# Patient Record
Sex: MALE | Race: WHITE
[De-identification: names, ages, dates, MRNs, and addresses within clinical notes are randomized per-mention and may not be internally consistent; named-entity substitution may affect disease eponyms.]

---

## 2017-01-19 ENCOUNTER — HOSPITAL ENCOUNTER (OUTPATIENT)
Dept: HOSPITAL 59 - SUR | Age: 82
Discharge: HOME | End: 2017-01-19
Attending: ORTHOPAEDIC SURGERY
Payer: MEDICARE

## 2017-01-19 DIAGNOSIS — E78.00: ICD-10-CM

## 2017-01-19 DIAGNOSIS — G56.01: Primary | ICD-10-CM

## 2017-01-19 DIAGNOSIS — I10: ICD-10-CM

## 2017-01-19 PROCEDURE — 64721 CARPAL TUNNEL SURGERY: CPT

## 2017-01-19 PROCEDURE — 01810 ANES PX NRV MUSC F/ARM WRST: CPT

## 2017-01-20 NOTE — OPERATIVE NOTE
DATE OF SURGERY:  01/19/2017.



SURGEON:  Genaro Higgins D.O.



REFERRING PHYSICIAN:  Estiven Sethi D.O. 



PREOPERATIVE DIAGNOSIS:  

CARPAL TUNNEL SYNDROME OF THE RIGHT WRIST.



POSTOPERATIVE DIAGNOSIS:  

CARPAL TUNNEL SYNDROME OF THE RIGHT WRIST.



OPERATIVE PROCEDURES: 

Decompression of the right median nerve of the wrist using 3.5 loupe 
magnification.



DESCRIPTION OF PROCEDURE:  This 82-year-old male was taken to the operating 
room and was placed in the supine position on the operating room table.  A 
general anesthetic was induced, and the right upper extremity was elevated.  It 
was prepped with Hibiclens and draped in the usual sterile fashion.  It was 
exsanguinated and the tourniquet was inflated to 250 mm Hg.



A palmar incision was utilized following the hypothenar crease from the level 
of the base of the web space of the thumb to the flexor crease of the wrist.  
Dissection was carried down through the skin and subcutaneous tissue.  
Hemostasis was obtained with the electrocautery.  The palmar fascia was divided 
in line with the skin incision to expose the flexor retinaculum.  This was 
punctured and split to its proximal margin.  With the contents of the carpal 
tunnel under direct vision, the transverse carpal ligament was transected along 
its ulnar border.  The radial flap was raised to expose the entire median nerve 
under the transverse carpal ligament.  The recurrent motor branch of the median 
nerve was identified and was found to be normal.  The patient had significant 
hyperemia of the nerve as well as mild to moderate hourglass deformity of the 
nerve under the transverse carpal ligament secondary to the compression.  



After the nerve was completely decompressed, the wound was irrigated and the 
tourniquet was released.  Hemostasis was obtained with the electrocautery.  The 
wound was closed with interrupted 6-0 nylon suture.  Sterile dressings were 
applied with plaster splint immobilization with the wrist in slight 
dorsiflexion and the thumb in an adducted position.  



The patient was then taken to the recovery room in satisfactory condition.



GROSS PATHOLOGY:  This patient demonstrated hyperemia of the median nerve as 
well as hourglass deformity as described above.  Mild atrophy of the nerve was 
also identified.







____________________________   ____________________________

Genaro Higgins D.O.       Date   Time



Job Number:  [id number]
MTDD

## 2017-07-05 ENCOUNTER — HOSPITAL ENCOUNTER (EMERGENCY)
Dept: HOSPITAL 59 - ER | Age: 82
Discharge: ANOTHER HOSPITAL | End: 2017-07-05
Payer: MEDICARE

## 2017-07-05 DIAGNOSIS — G81.90: ICD-10-CM

## 2017-07-05 DIAGNOSIS — Z95.1: ICD-10-CM

## 2017-07-05 DIAGNOSIS — I10: ICD-10-CM

## 2017-07-05 DIAGNOSIS — R29.715: ICD-10-CM

## 2017-07-05 DIAGNOSIS — I63.9: Primary | ICD-10-CM

## 2017-07-05 DIAGNOSIS — Z87.891: ICD-10-CM

## 2017-07-05 DIAGNOSIS — Z86.73: ICD-10-CM

## 2017-07-05 DIAGNOSIS — I25.2: ICD-10-CM

## 2017-07-05 DIAGNOSIS — R51: ICD-10-CM

## 2017-07-05 LAB
ANION GAP SERPL CALC-SCNC: 7.9 MMOL/L (ref 7–16)
APTT BLD: 28.9 SECONDS (ref 24.5–39.1)
BASOPHILS NFR BLD: 0.2 % (ref 0–6)
BUN SERPL-MCNC: 9 MG/DL (ref 9–20)
CO2 SERPL-SCNC: 28.1 MMOL/L (ref 22–30)
CREAT SERPL-MCNC: 0.7 MG/DL (ref 0.66–1.25)
EOSINOPHIL NFR BLD: 1.5 % (ref 0–6)
ERYTHROCYTE [DISTWIDTH] IN BLOOD BY AUTOMATED COUNT: 13.1 % (ref 11.5–14.5)
EST GLOMERULAR FILTRATION RATE: > 60 ML/MIN
GLUCOSE SERPL-MCNC: 104 MG/DL (ref 70–110)
GRANULOCYTES NFR BLD: 63.4 % (ref 47–80)
HCT VFR BLD CALC: 38.6 % (ref 42–52)
HGB BLD-MCNC: 13.2 GM/DL (ref 14–18)
INR PPP: 0.97
LYMPHOCYTES NFR BLD AUTO: 26.9 % (ref 16–45)
MCH RBC QN AUTO: 30.5 PG (ref 27–33)
MCHC RBC AUTO-ENTMCNC: 34.2 G/DL (ref 32–36)
MCV RBC AUTO: 89.4 FL (ref 81–97)
MONOCYTES NFR BLD: 8 % (ref 0–9)
PLATELET # BLD: 225 K/UL (ref 130–400)
PMV BLD AUTO: 9 FL (ref 7.4–10.4)
PROTHROMBIN TIME: 11 SECONDS (ref 9.5–12.1)
RBC # BLD AUTO: 4.32 M/UL (ref 4.4–5.7)
WBC # BLD AUTO: 8.5 K/UL (ref 4.2–12.2)

## 2017-07-05 PROCEDURE — 80048 BASIC METABOLIC PNL TOTAL CA: CPT

## 2017-07-05 PROCEDURE — 99285 EMERGENCY DEPT VISIT HI MDM: CPT

## 2017-07-05 PROCEDURE — 85610 PROTHROMBIN TIME: CPT

## 2017-07-05 PROCEDURE — 85730 THROMBOPLASTIN TIME PARTIAL: CPT

## 2017-07-05 PROCEDURE — 93005 ELECTROCARDIOGRAM TRACING: CPT

## 2017-07-05 PROCEDURE — 70450 CT HEAD/BRAIN W/O DYE: CPT

## 2017-07-05 PROCEDURE — 85025 COMPLETE CBC W/AUTO DIFF WBC: CPT

## 2017-07-05 NOTE — EMERGENCY DEPARTMENT RECORD
History of Present Illness





- General


Chief Complaint: Numbness


Stated Complaint: RIGHT HAND NUMBNESS/POSSIBLE STROKE


Time Seen by Provider: 17 10:26


Source: Patient, RN notes reviewed


Mode of Arrival: Ambulatory





- History of Present Illness


Initial Comments: 


weakness and numbness of the right hand and arm new in the last 14 hours 

started at 8pm last night. PCP Dr. Sethi and  he had a previous CVA 3 years 

ago on the same side but he regained his function in 3-4 days last time.  PMH 

CABG  and  and he had carpal tunnel surg 2017. Wife brought him to 

the hospital. Slight headache PMH HTN and hypercholesterolemia





Onset/Timin


-: Hour(s)


Location: Right arm


History of same: Yes


Place: Home


Severity: Moderate





- [place] Coma Scale


Eye Response: (4) Open spontaneously


Motor Response: (6) Obeys commands


Verbal Response: (5) Oriented


[place] Total: 15





- Related Data


Home Medications: 


 Home Medications











 Medication  Instructions  Recorded  Confirmed  Last Taken


 


Aspirin Chewable 325 mg PO DAILY 10/16/14 07/05/17 1 Day Ago





    ~17


 


Cyanocobalamin (Vitamin B-12) 2,000 mcg PO DAILY 10/16/14 07/05/17 1 Day Ago





[Vitamin B-12]    ~17


 


Diltiazem HCl [Cardizem Cd] 240 mg PO DAILY 10/16/14 07/05/17 1 Day Ago





    ~17


 


Folic Acid 0.4 mg PO DAILY 10/16/14 07/05/17 1 Day Ago





    ~17


 


Glucosamine/Methylsulfonylmeth 1 each PO DAILY 10/16/14 07/05/17 1 Day Ago





[Glucosamine-MSM Caplet]    ~17


 


Metoprolol Succinate [Toprol Xl] 25 mg PO DAILY 10/16/14 07/05/17 1 Day Ago





    ~17


 


Omega-3 Fatty Acids [Omega-3] 1,000 mg PO DAILY 10/16/14 07/05/17 1 Day Ago





    ~17


 


Pravastatin Sodium [Pravastatin 80 mg PO QHS 10/16/14 07/05/17 1 Day Ago





Sodium]    ~17


 


Pyridoxine HCl [Vitamin B-6] 100 mg PO DAILY 10/16/14 07/05/17 1 Day Ago





    ~17











Allergies/Adverse Reactions: 


 Allergies











Allergy/AdvReac Type Severity Reaction Status Date / Time


 


No Known Drug Allergies Allergy   Verified 17 10:19














Travel Screening





- Travel/Exposure Within Last 30 Days


Have you traveled within the last 30 days?: No





- Travel/Exposure Within Last Year


Have you traveled outside the [place] in the last year?: No





- Additonal Travel Details


Have you been exposed to anyone with a communicable illness?: No





- Travel Symptoms


Symptom Screening: None





Review of Systems


Reviewed: No additional complaints except as noted below


Constitutional: Reports: As per HPI.  Denies: Chills, Fever, Malaise, Night 

sweats, Weakness, Weight change


Eyes: Reports: As per HPI.  Denies: Eye discharge, Eye pain, Photophobia, 

Vision change


ENT: Reports: As per HPI.  Denies: Congestion, Dental pain, Ear pain, Epistaxis

, Hearing loss, Throat pain


Respiratory: Reports: As per HPI.  Denies: Cough, Dyspnea, Hemoptysis, Stridor, 

Wheezes


Cardiovascular: Reports: As per HPI.  Denies: Arrhythmia, Chest pain, Dyspnea 

on exertion, Edema, Murmurs, Orthopnea, Palpitations, Paroxysmal nocturnal 

dyspnea, Rheumatic Fever, Syncope


Endocrine: Reports: As per HPI.  Denies: Fatigue, Heat or cold intolerance, 

Polydipsia, Polyuria


Gastrointestinal: Reports: As per HPI.  Denies: Abdominal pain, Constipation, 

Diarrhea, Hematemesis, Hematochezia, Melena, Nausea, Vomiting


Genitourinary: Reports: As per HPI.  Denies: Dysuria, Frequency, Hematuria, 

Incontinence, Retention, Testicular pain, Testicular mass, Urgency


Musculoskeletal: Reports: As per HPI.  Denies: Arthralgia, Back pain, Gout, 

Joint swelling, Myalgia, Neck pain


Skin: Reports: As per HPI.  Denies: Bruising, Change in color, Change in hair/

nails, Lesions, Pruritus, Rash


Neurological: Reports: As per HPI, Numbness (right hand ), Weakness (weakness 

right hand ), Other (uncordination right hand and arm).  Denies: Abnormal gait, 

Confusion, Headache, Paresthesias, Seizure, Tingling, Tremors, Vertigo


Psychiatric: Reports: As per HPI.  Denies: Anxiety, Auditory hallucinations, 

Depression, Homicidal thoughts, Suicidal thoughts, Visual hallucinations


Hematological/Lymphatic: Reports: As per HPI.  Denies: Anemia, Blood Clots, 

Easy bleeding, Easy bruising, Swollen glands





Past Medical History





- SOCIAL HISTORY


Smoking Status: Former smoker


Alcohol Use: None


Drug Use: None





- RESPIRATORY


Hx Respiratory Disorders: No





- CARDIOVASCULAR


Hx Cardio Disorders: Yes


Hx Abnormal EKG: Yes


Hx Cardiac Cath: Yes (SEVERAL)


Hx Deep Vein Thrombosis: Yes (25 YRS AGO)


Hx Heart Attack: Yes


Hx Hypertension: Yes


Hx Irregular Heartbeat: Yes


Hx Coronary Artery Disease: Yes


Hx Coronary Artery Bypass Graft: Yes





- NEURO


Hx Neuro Disorders: Yes


Hx CVA: Yes (2 YRS AGO)


Hx Dizziness: Yes


Hx Headaches: Yes





- GI


Hx GI Disorders: No


Hx of Polyps: Yes (COLON BENIGN)


Comment:: INCISIONAL HERNIA FROM CHEST INCISION





- 


Hx Genitourinary Disorders: No





- ENDOCRINE


Hx Endocrine Disorders: No





- MUSCULOSKELETAL


Hx Musculoskeletal Disorders: Yes





- PSYCH


Hx Psych Problems: Yes


Hx Anxiety: Yes





- HEMATOLOGY/ONCOLOGY


Hx Hematology/Oncology Disorders: No





Family Medical History


Any Significant Family History?: Yes


Hx Heart Disease: Father, Grandparents





Physical Exam





- General


General Appearance: Alert, Oriented x3, Cooperative, Mild distress





- Head


Head exam: Normal inspection





- Eye


Eye exam: Normal appearance, PERRL


Pupils: Normal accommodation





- ENT


ENT exam: Normal exam, Mucous membranes moist, Normal external ear exam, Normal 

orophraynx, TM's normal bilaterally


Ear exam: Normal external inspection.  negative: External canal tenderness


Nasal Exam: Normal inspection.  negative: Discharge, Sinus tenderness


Mouth exam: Normal external inspection, Tongue normal


Teeth exam: Normal inspection.  negative: Dental caries


Throat exam: Normal inspection.  negative: Tonsillar erythema, Tonsillar exudate





- Neck


Neck exam: Normal inspection, Full ROM.  negative: Tenderness





- Respiratory


Respiratory exam: Normal lung sounds bilaterally.  negative: Respiratory 

distress





- Cardiovascular


Cardiovascular Exam: Regular rate, Normal rhythm, Normal heart sounds





- GI/Abdominal


GI/Abdominal exam: Soft, Normal bowel sounds.  negative: Tenderness





- Rectal


Rectal exam: Deferred





- 


 exam: Deferred





- Extremities


Extremities exam: Normal inspection, Full ROM, Normal capillary refill.  

negative: Tenderness





- Back


Back exam: Reports: Normal inspection, Full ROM.  Denies: Muscle spasm, Rash 

noted, Tenderness





- Neurological


Neurological exam: Alert, Normal gait, Oriented X3, Reflexes normal





- Psychiatric


Psychiatric exam: Normal affect, Normal mood





- Skin


Skin exam: Dry, Intact, Normal color, Warm





Course





 Vital Signs











  17





  10:08


 


Temperature 97.5 F L


 


Pulse Rate 54 L


 


Respiratory 18





Rate 


 


Blood Pressure 177/79


 


Pulse Ox 98














- Reevaluation(s)


Reevaluation #1: 


discussed case with Dr. Jasso and he accepted the patient and will transfer to 

[facility].


17 12:31








Medical Decision Making





- Data Complexity


MDM Data: Labs Ordered and/or Reviewed, X-Ray Ordered and/or Reviewed (CT head 

old left capsule infarction , No acute findings), EKG Ordered and/or Reviewed (

NSR, no acute changes)





- Lab Data


Result diagrams: 


 17 10:50





 17 10:50





Disposition


Clinical Impression: 


CVA (cerebral vascular accident)


Qualifiers:


 CVA mechanism: unspecified Qualified Code(s): I63.9 - Cerebral infarction, 

unspecified





Disposition: Acute Care Hospital Transfer


Condition: (1) Good


Forms:  Patient Portal Access


Time of Disposition: 12:33

## 2017-07-05 NOTE — CT SCAN REPORT
EXAM:  CT OF THE BRAIN WITHOUT CONTRAST 



HISTORY:  RIGHT ARM NUMBNESS.  



TECHNIQUE:  Sequential axial images were obtained from the foramen magnum to 
the vertex without contrast administration.  



FINDINGS:  There is periventricular small vessel ischemia.  There is no large 
territorial infarct, hemorrhage, mass effect, or midline shift.  There is a 
remote lacunar infarct in the posterior limb of the left internal capsule.  
There is intracranial vascular calcification.  



IMPRESSION:  

1.  PERIVENTRICULAR SMALL VESSEL ISCHEMIA.  



2.  REMOTE LACUNAR INFARCT IN THE POSTERIOR LIMB OF THE LEFT INTERNAL CAPSULE.  



JOB NUMBER:  [id number]
MTDD

## 2019-08-14 ENCOUNTER — HOSPITAL ENCOUNTER (OUTPATIENT)
Dept: HOSPITAL 59 - SUR | Age: 84
Discharge: HOME | End: 2019-08-14
Attending: PAIN MEDICINE
Payer: MEDICARE

## 2019-08-14 DIAGNOSIS — I25.2: ICD-10-CM

## 2019-08-14 DIAGNOSIS — M54.14: Primary | ICD-10-CM

## 2019-08-14 DIAGNOSIS — I10: ICD-10-CM

## 2019-08-14 DIAGNOSIS — Z86.73: ICD-10-CM

## 2019-08-14 DIAGNOSIS — Z86.718: ICD-10-CM

## 2019-08-14 DIAGNOSIS — E78.00: ICD-10-CM

## 2019-08-14 PROCEDURE — 01992 ANES DX/THER NRV BLK&INJ PRN: CPT

## 2019-08-14 PROCEDURE — 62321 NJX INTERLAMINAR CRV/THRC: CPT

## 2019-08-16 NOTE — OPERATIVE NOTE
DATE OF SURGERY: 08/14/2019 



PREOPERATIVE DIAGNOSIS: Right thoracic radiculopathy, ICD10 code M54.14. 



OPERATION: Fluoroscopic-guided thoracic epidural injection T4-5. 



ANESTHESIA: Local with sedation.



ANESTHESIA PROVIDER: ROSS Carmona CRNA



INDICATION: This patient presents with right-sided thoracic spine pain. 
Examination showed diffuse tenderness at thoracic spine. Range of motion does 
cause pain throughout the area. Diagnostic imaging shows extensive multilevel 
spondylosis. 



PROCEDURE: Intravenous line, vital sign monitoring, IV sedation, prepped and 
draped in sterile technique. Under imaging, thoracic epidural interspace right 
of the midline at T4-5 marked, infiltrated. An 18-gauge Tuohy needle, loss of 
resistance, 10 mL 0.125% Marcaine with dexamethasone injected. Needle removed. 
Back cleaned. Topical antibiotic and sterile dressing applied. We will monitor 
and evaluate. 

MTDD

## 2019-09-16 ENCOUNTER — HOSPITAL ENCOUNTER (EMERGENCY)
Dept: HOSPITAL 59 - ER | Age: 84
Discharge: HOME | End: 2019-09-16
Payer: MEDICARE

## 2019-09-16 DIAGNOSIS — F17.210: ICD-10-CM

## 2019-09-16 DIAGNOSIS — I25.2: ICD-10-CM

## 2019-09-16 DIAGNOSIS — Z87.820: ICD-10-CM

## 2019-09-16 DIAGNOSIS — R51: Primary | ICD-10-CM

## 2019-09-16 DIAGNOSIS — R11.0: ICD-10-CM

## 2019-09-16 DIAGNOSIS — R42: ICD-10-CM

## 2019-09-16 DIAGNOSIS — I10: ICD-10-CM

## 2019-09-16 LAB
ABSOLUTE NEUTROPHIL COUNT: 3.22
ALBUMIN SERPL-MCNC: 4 G/DL (ref 4–5)
ALBUMIN/GLOB SERPL: 1.5 {RATIO} (ref 1.1–1.8)
ALP SERPL-CCNC: 69 U/L (ref 40–129)
ALT SERPL-CCNC: 13 U/L (ref ?–41)
ANION GAP SERPL CALC-SCNC: 9 MMOL/L (ref 7–16)
APTT BLD: 28 SECONDS (ref 24.5–39.1)
AST SERPL-CCNC: 21 U/L (ref 10–50)
BASOPHILS NFR BLD: 0.3 % (ref 0–6)
BILIRUB SERPL-MCNC: 0.4 MG/DL (ref 0.2–1)
BUN SERPL-MCNC: 9 MG/DL (ref 8–23)
CO2 SERPL-SCNC: 29 MMOL/L (ref 22–29)
CREAT SERPL-MCNC: 0.8 MG/DL (ref 0.7–1.2)
EOSINOPHIL NFR BLD: 1.3 % (ref 0–6)
ERYTHROCYTE [DISTWIDTH] IN BLOOD BY AUTOMATED COUNT: 13.3 % (ref 11.5–14.5)
EST GLOMERULAR FILTRATION RATE: > 60 ML/MIN
GLOBULIN SER-MCNC: 2.6 GM/DL (ref 1.4–4.8)
GLUCOSE SERPL-MCNC: 124 MG/DL (ref 74–109)
GRANULOCYTES NFR BLD: 53.7 % (ref 47–80)
HCT VFR BLD CALC: 40.6 % (ref 42–52)
HGB BLD-MCNC: 13.6 GM/DL (ref 14–18)
INR PPP: 1
LYMPHOCYTES NFR BLD AUTO: 33.1 % (ref 16–45)
MCH RBC QN AUTO: 30.1 PG (ref 27–33)
MCHC RBC AUTO-ENTMCNC: 33.5 G/DL (ref 32–36)
MCV RBC AUTO: 90 FL (ref 81–97)
MONOCYTES NFR BLD: 11.6 % (ref 0–9)
PLATELET # BLD: 186 K/UL (ref 130–400)
PMV BLD AUTO: 8.4 FL (ref 7.4–10.4)
PROT SERPL-MCNC: 6.6 G/DL (ref 6.6–8.7)
PROTHROMBIN TIME: 10.7 SECONDS (ref 9.5–12.1)
RBC # BLD AUTO: 4.51 M/UL (ref 4.4–5.7)
WBC # BLD AUTO: 6 K/UL (ref 4.2–12.2)

## 2019-09-16 PROCEDURE — 96365 THER/PROPH/DIAG IV INF INIT: CPT

## 2019-09-16 PROCEDURE — 85610 PROTHROMBIN TIME: CPT

## 2019-09-16 PROCEDURE — 99284 EMERGENCY DEPT VISIT MOD MDM: CPT

## 2019-09-16 PROCEDURE — 80053 COMPREHEN METABOLIC PANEL: CPT

## 2019-09-16 PROCEDURE — 85730 THROMBOPLASTIN TIME PARTIAL: CPT

## 2019-09-16 PROCEDURE — 70450 CT HEAD/BRAIN W/O DYE: CPT

## 2019-09-16 PROCEDURE — 85025 COMPLETE CBC W/AUTO DIFF WBC: CPT

## 2019-09-16 RX ADMIN — SODIUM CHLORIDE ONE MLS/HR: 9 INJECTION, SOLUTION INTRAVENOUS at 11:03

## 2019-09-16 NOTE — EMERGENCY DEPARTMENT RECORD
History of Present Illness





- General


Chief complaint: Vomiting


Stated complaint: VOMITING


Time Seen by Provider: 19 10:03


Source: Patient


Mode of Arrival: Ambulatory


Limitations: No limitations





- History of Present Illness


Initial comments: 


The patient is here due to a 2 day hx of vomiting and a HA. The patient states 

he his his head in the garage 3 weeks ago and did sustain a small head 

laceration. The patient did not have any complaints of pain or HA after. He was 

doing very well until yesterday when he developed a bad headache over the top of

his head at the site of the trauma with vomiting. The patient denies any neck 

pain, visual changes, confusion or balance issues. Today the patient is no 

longer nauseated or vomiting but still has the pain.





MD complaint: Vomiting


Onset/Timin


-: Days(s)


Severity scale (1-10): 6


Quality: Aching


Improves with: None


Worsens with: None


Associated Symptoms: Other





- Related Data


                                Home Medications











 Medication  Instructions  Recorded  Confirmed  Last Taken


 


Hydrocodone/Acetaminophen 1 tab PO QID PRN 19 Unknown





[Hydrocodone/Acetaminophen    





10mg/325mg]    











                                    Allergies











Allergy/AdvReac Type Severity Reaction Status Date / Time


 


No Known Drug Allergies Allergy   Verified 19 09:59














Travel Screening





- Travel/Exposure Within Last 30 Days


Have you traveled within the last 30 days?: No





Review of Systems


Constitutional: Denies: Chills, Fever


Eyes: Denies: Eye discharge


ENT: Denies: Congestion


Respiratory: Denies: Cough, Dyspnea





Past Medical History





- SOCIAL HISTORY


Smoking Status: Former smoker


Alcohol Use: None


Drug Use: None





- RESPIRATORY


Hx Respiratory Disorders: Yes


Hx Bronchitis: Yes





- CARDIOVASCULAR


Hx Cardio Disorders: Yes


Hx Abnormal EKG: Yes


Hx Cardiac Cath: Yes (SEVERAL)


Hx Deep Vein Thrombosis: Yes (25 YRS AGO)


Hx Heart Attack: Yes


Hx Hypertension: Yes


Hx Irregular Heartbeat: Yes


Hx Coronary Artery Disease: Yes


Hx Coronary Artery Bypass Graft: Yes





- NEURO


Hx Neuro Disorders: Yes


Hx CVA: Yes


Hx Dizziness: Yes (OCCASSIONALLY)


Hx Headaches: Yes


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


Hx Weakness: Yes (RIGHT ARM)





- GI


Hx GI Disorders: Yes


Hx of Polyps: Yes (COLON BENIGN)


Comment:: INCISIONAL HERNIA FROM CHEST INCISION





- 


Hx Genitourinary Disorders: No





- ENDOCRINE


Hx Endocrine Disorders: No





- MUSCULOSKELETAL


Hx Musculoskeletal Disorders: Yes


Hx Arthritis: Yes (BACK AND SHOULDERS)





- PSYCH


Hx Psych Problems: No


Hx Anxiety: No





- HEMATOLOGY/ONCOLOGY


Hx Hematology/Oncology Disorders: No





Family Medical History


Any Significant Family History?: Yes


Hx Heart Disease: Father, Grandparents





Physical Exam





- General


General Appearance: Alert, Oriented x3, Cooperative, No acute distress





- Head


Head exam: Atraumatic, Normocephalic, Normal inspection


Head exam detail: negative: Abrasion, Contusion, Leija's sign, General 

tenderness, Tenderness of temporal artery


Image of Face/Head: 


                            __________________________














                            __________________________





 1 - Area of pain at site of trauma.








- Eye


Eye exam: Normal appearance, PERRL, EOMI





- ENT


Throat exam: Normal inspection.  negative: Tonsillar erythema, Tonsillar exudate





- Neck


Neck exam: Normal inspection, Full ROM.  negative: Tenderness





- Respiratory


Respiratory exam: Normal lung sounds bilaterally.  negative: Respiratory 

distress





- Cardiovascular


Cardiovascular Exam: Regular rate, Normal rhythm, Normal heart sounds





- GI/Abdominal


GI/Abdominal exam: Soft, Normal bowel sounds.  negative: Tenderness





- Extremities


Extremities exam: Normal inspection, Full ROM, Normal capillary refill.  

negative: Tenderness





- Neurological


Neurological exam: Alert, Normal gait (with his cane.), Oriented X3.  negative: 

Abnormal gait, Altered, Motor sensory deficit





- Psychiatric


Psychiatric exam: negative: Depressed





- Skin


Skin exam: negative: Rash





Course





- Reevaluation(s)


Reevaluation #1: 


The patient is doing a lot better at this time. His HA has resolved with the 

Ofirmiv and he is feeling back to normal. The patient is answering all questions

appropriately and is ambulating normally with his cane. He is to see his PCP 

this week for recheck and to return to the ER for any worsening symptoms.


19 12:09








Medical Decision Making





- Data Complexity


MDM Data: Labs Ordered and/or Reviewed, X-Ray Ordered and/or Reviewed





- Lab Data


Result diagrams: 


                                 19 10:20





                                 19 10:20





- Radiology Data


Radiology results: Report reviewed (Head CT: Neg for acute intracranial 

hemorrhage or mass or any acute changes.)





Disposition


Disposition: Discharge


Clinical Impression: 


Headache


Qualifiers:


 Headache type: unspecified Headache chronicity pattern: acute headache 

Intractability: not intractable Qualified Code(s): R51 - Headache





Disposition: Home, Self-Care


Condition: (2) Stable


Instructions:  Acute Headache (ED)


Additional Instructions: 


Please use Tylenol for pain and ice to the top of the head. Please see your 

doctor later this week for recheck and return to the ER for any worsening 

symptoms.


Forms:  Patient Portal Access


Time of Disposition: 12:11





Quality





- Quality Measures


Quality Measures: N/A





- Blood Pressure Screening


View Details: Yes


Does Patient Have Any of the Following: Active Dx of HTN


Blood Pressure Classification: Hypertensive Reading


Systolic Measurement: 165


Diastolic Measurement: 107


Screening for High Blood Pressure: Patient Exclusion, Hx of HTN []

## 2019-10-15 ENCOUNTER — HOSPITAL ENCOUNTER (EMERGENCY)
Dept: HOSPITAL 59 - ER | Age: 84
Discharge: ANOTHER HOSPITAL | End: 2019-10-15
Payer: MEDICARE

## 2019-10-15 DIAGNOSIS — I25.2: ICD-10-CM

## 2019-10-15 DIAGNOSIS — R42: ICD-10-CM

## 2019-10-15 DIAGNOSIS — R53.83: ICD-10-CM

## 2019-10-15 DIAGNOSIS — Z86.73: ICD-10-CM

## 2019-10-15 DIAGNOSIS — I10: ICD-10-CM

## 2019-10-15 DIAGNOSIS — R07.9: Primary | ICD-10-CM

## 2019-10-15 LAB
ABSOLUTE NEUTROPHIL COUNT: 4.18
ALBUMIN SERPL-MCNC: 4.2 G/DL (ref 4–5)
ALBUMIN/GLOB SERPL: 1.7 {RATIO} (ref 1.1–1.8)
ALP SERPL-CCNC: 76 U/L (ref 40–129)
ALT SERPL-CCNC: 14 U/L (ref ?–41)
ANION GAP SERPL CALC-SCNC: 10 MMOL/L (ref 7–16)
APTT BLD: 29.1 SECONDS (ref 24.5–39.1)
AST SERPL-CCNC: 20 U/L (ref 10–50)
BASOPHILS NFR BLD: 0.3 % (ref 0–6)
BILIRUB SERPL-MCNC: 0.3 MG/DL (ref 0.2–1)
BUN SERPL-MCNC: 13 MG/DL (ref 8–23)
CO2 SERPL-SCNC: 27 MMOL/L (ref 22–29)
CREAT SERPL-MCNC: 0.8 MG/DL (ref 0.7–1.2)
EOSINOPHIL NFR BLD: 1.6 % (ref 0–6)
ERYTHROCYTE [DISTWIDTH] IN BLOOD BY AUTOMATED COUNT: 13.4 % (ref 11.5–14.5)
EST GLOMERULAR FILTRATION RATE: > 60 ML/MIN
GLOBULIN SER-MCNC: 2.5 GM/DL (ref 1.4–4.8)
GLUCOSE SERPL-MCNC: 95 MG/DL (ref 74–109)
GRANULOCYTES NFR BLD: 54.7 % (ref 47–80)
HCT VFR BLD CALC: 39.7 % (ref 42–52)
HGB BLD-MCNC: 13.3 GM/DL (ref 14–18)
INR PPP: 1.1
LYMPHOCYTES NFR BLD AUTO: 32.4 % (ref 16–45)
MCH RBC QN AUTO: 30.2 PG (ref 27–33)
MCHC RBC AUTO-ENTMCNC: 33.5 G/DL (ref 32–36)
MCV RBC AUTO: 90.2 FL (ref 81–97)
MONOCYTES NFR BLD: 11 % (ref 0–9)
PLATELET # BLD: 177 K/UL (ref 130–400)
PMV BLD AUTO: 8.4 FL (ref 7.4–10.4)
PROT SERPL-MCNC: 6.7 G/DL (ref 6.6–8.7)
PROTHROMBIN TIME: 10.8 SECONDS (ref 9.5–12.1)
RBC # BLD AUTO: 4.4 M/UL (ref 4.4–5.7)
WBC # BLD AUTO: 7.6 K/UL (ref 4.2–12.2)

## 2019-10-15 PROCEDURE — 99285 EMERGENCY DEPT VISIT HI MDM: CPT

## 2019-10-15 PROCEDURE — 71046 X-RAY EXAM CHEST 2 VIEWS: CPT

## 2019-10-15 PROCEDURE — 93005 ELECTROCARDIOGRAM TRACING: CPT

## 2019-10-15 PROCEDURE — 93010 ELECTROCARDIOGRAM REPORT: CPT

## 2019-10-15 PROCEDURE — 84484 ASSAY OF TROPONIN QUANT: CPT

## 2019-10-15 PROCEDURE — 80053 COMPREHEN METABOLIC PANEL: CPT

## 2019-10-15 PROCEDURE — 85730 THROMBOPLASTIN TIME PARTIAL: CPT

## 2019-10-15 PROCEDURE — 85025 COMPLETE CBC W/AUTO DIFF WBC: CPT

## 2019-10-15 PROCEDURE — 85610 PROTHROMBIN TIME: CPT

## 2019-10-15 PROCEDURE — 83880 ASSAY OF NATRIURETIC PEPTIDE: CPT

## 2019-10-15 NOTE — EMERGENCY DEPARTMENT RECORD
History of Present Illness





- General


Chief Complaint: Chest Pain


Stated Complaint: CHEST PAIN


Time Seen by Provider: 10/15/19 12:21


Source: Patient


Mode of Arrival: Ambulatory


Limitations: No limitations





- History of Present Illness


Initial Comments: 





85 yo male presents with intermittent chest pain since about Sunday.  The 

discomfort comes and goes.  It is currently gone.  It is located in the left 

upper sternal border.  He denies sweating or nausea.  No real shortness of 

breath.  He did notice that he is now very fatigued.  No radiation to the back. 

No arm pain.  He had CABG in 1998 and 2002.  Last stress test was in 2011.  He 

was given a nitro by his PCP this morning with resolution of the pain.  PCP is 

Navdeep.  He states he is in a neurology stroke study through [facility] and was 

instructed not to take aspirin.  He takes a study medication daily.


MD Complaint: Chest pain


-: Days(s)


Onset: During rest


Pain Location: Left chest


Pain Radiation: None


Severity: Moderate


Quality: Sharp


Consistency: Intermittent


Improves With: Nitroglycerin


Worsens With: Nothing


Context: Other


Anginal Symptoms: Other (fatigue)


Other Symptoms: Other


Treatments Prior to Arrival: None





- Related Data


                                Home Medications











 Medication  Instructions  Recorded  Confirmed  Last Taken


 


Clotrimazole/Betamethasone Dip 45 gm TP TID 10/15/19 10/15/19 Unknown





[Lotrisone Cream]    


 


Nitroglycerin [Nitrostat] 0.4 mg SL ASDIR 10/15/19 10/15/19 Unknown


 


Psyllium Husk [Metamucil] 0.4 gm PO DAILY 10/15/19 10/15/19 Unknown











                                    Allergies











Allergy/AdvReac Type Severity Reaction Status Date / Time


 


No Known Drug Allergies Allergy   Verified 09/16/19 09:59














Review of Systems


Constitutional: Reports: Malaise, Weakness.  Denies: Chills, Fever


Eyes: Denies: Eye discharge, Eye pain


ENT: Denies: Congestion, Throat pain


Respiratory: Denies: Cough, Dyspnea


Cardiovascular: Reports: Chest pain.  Denies: Arrhythmia, Edema, Palpitations, 

Syncope


Endocrine: Reports: Fatigue.  Denies: Polydipsia, Polyuria


Gastrointestinal: Denies: Abdominal pain, Diarrhea, Nausea, Vomiting


Musculoskeletal: Denies: Arthralgia, Back pain, Neck pain


Skin: Denies: Bruising, Change in color, Rash


Neurological: Reports: Weakness.  Denies: Headache, Numbness


Psychiatric: Denies: Anxiety


Hematological/Lymphatic: Denies: Easy bleeding, Easy bruising





Past Medical History





- SOCIAL HISTORY


Smoking Status: Former smoker


Drug Use: None





- RESPIRATORY


Hx Respiratory Disorders: Yes


Hx Bronchitis: Yes





- CARDIOVASCULAR


Hx Cardio Disorders: Yes


Hx Abnormal EKG: Yes


Hx Cardiac Cath: Yes (SEVERAL)


Hx Deep Vein Thrombosis: Yes (25 YRS AGO)


Hx Heart Attack: Yes


Hx Hypertension: Yes


Hx Irregular Heartbeat: Yes


Hx Coronary Artery Disease: Yes


Hx Coronary Artery Bypass Graft: Yes





- NEURO


Hx Neuro Disorders: Yes


Hx CVA: Yes


Hx Dizziness: Yes (OCCASSIONALLY)


Hx Headaches: Yes


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


Hx Weakness: Yes (RIGHT ARM)





- GI


Hx GI Disorders: Yes


Hx of Polyps: Yes (COLON BENIGN)


Comment:: INCISIONAL HERNIA FROM CHEST INCISION





- 


Hx Genitourinary Disorders: No





- ENDOCRINE


Hx Endocrine Disorders: No





- MUSCULOSKELETAL


Hx Musculoskeletal Disorders: Yes


Hx Arthritis: Yes (BACK AND SHOULDERS)





- PSYCH


Hx Psych Problems: No


Hx Anxiety: No





- HEMATOLOGY/ONCOLOGY


Hx Hematology/Oncology Disorders: No





Family Medical History


Hx Heart Disease: Father, Grandparents





Physical Exam





- General


General Appearance: Alert, Oriented x3, Cooperative, No acute distress





- Head


Head exam: Atraumatic, Normocephalic, Normal inspection





- Eye


Eye exam: Normal appearance, PERRL


Pupils: Normal accommodation





- ENT


ENT exam: Normal exam, Mucous membranes moist, Normal external ear exam


Ear exam: Normal external inspection


Nasal Exam: Normal inspection.  negative: Discharge


Mouth exam: Normal external inspection


Teeth exam: Normal inspection


Throat exam: Normal inspection





- Neck


Neck exam: Normal inspection, Full ROM.  negative: Tenderness





- Respiratory


Respiratory exam: Normal lung sounds bilaterally.  negative: Accessory muscle 

use, Chest wall tenderness, Decreased breath sounds, Prolonged expiratory, 

Respiratory distress, Rhonchi, Stridor, Wheezes





- Cardiovascular


Cardiovascular Exam: Regular rate, Normal rhythm, Normal heart sounds


Peripheral Pulses: 2+: Radial (R), Radial (L)





- GI/Abdominal


GI/Abdominal exam: Soft, Normal bowel sounds.  negative: Tenderness





- Rectal


Rectal exam: Deferred





- 


 exam: Deferred





- Extremities


Extremities exam: Normal inspection, Full ROM, Normal capillary refill.  negativ

e: Tenderness





- Back


Back exam: Reports: Normal inspection, Full ROM.  Denies: Muscle spasm, 

Tenderness





- Neurological


Neurological exam: Alert, Normal gait, Oriented X3





- Psychiatric


Psychiatric exam: Normal affect, Normal mood.  negative: Agitated, Anxious





- Skin


Skin exam: Dry, Intact, Normal color, Warm





Course





- Reevaluation(s)


Reevaluation #1: 


EKG #1:  12:13


Rate:  53


Rhythm:  sinus


Axis:   L


Intervals:   


ST segments:  NS flat lateral T waves


Prior:  7/5/17


The patient reports he is pain free at this time


He refused aspirin


He states he is on a medication study through stroke neurology at [facility] and 

was told not to take aspirin.


10/15/19 12:43


The CBC is normal


10/15/19 13:00


The labs were reviewed


Troponin is normal


The pro-BNP is 581


Normal renal function


10/15/19 13:38


CXR reviewed.  Chronic post op changes, tortuous aorta, no effusion or PVC


[facility] was contacted to discuss with [facility] cardiology.


10/15/19 13:48


Dr Murillo accepts the patient for transfer to [facility] for observation.


10/15/19 15:44


Still waiting for a bed at [facility]


No complaints from the patient


A second troponin will be sent





Medical Decision Making





- Lab Data


Result diagrams: 


                                 10/15/19 12:30





                                 10/15/19 12:30





Disposition


Disposition: Transfer


Clinical Impression: 


Chest pain


Qualifiers:


 Chest pain type: unspecified Qualified Code(s): R07.9 - Chest pain, unspecified





Disposition: Acute Care Hospital Transfer


Transfer To: [facility]


Reason For Transfer: Chest Pain


Accepting Physician: Lidia


Time Discussed w/Accepting Physician: 13:48


Condition: (2) Stable


Forms:  Patient Portal Access


Time of Disposition: 13:39





Quality





- Quality Measures


Quality Measures: N/A





- Blood Pressure Screening


Does Patient Have Any of the Following: Active Dx of HTN


Blood Pressure Classification: Hypertensive Reading


Systolic Measurement: 164


Diastolic Measurement: 92


Screening for High Blood Pressure: Patient Exclusion, Hx of HTN []

## 2019-10-15 NOTE — RADIOLOGY REPORT
EXAM:  CHEST 2 VIEWS



HISTORY:  CHEST PAIN AND DIZZINESS FOR ONE WEEK. 



TECHNIQUE: Upright PA and lateral views of the chest. 



COMPARISON: Two-view chest radiographic examination dated 10/14/2013. Two-view 
radiographic examination of the thoracic spine dated 04/18/2019. Post median 
sternotomy. 



FINDINGS: Post median sternotomy changes are redemonstrated. The heart is not 
enlarged. No pulmonary venous hypertension is seen. The thoracic aorta remains 
tortuous and atherosclerotic. Minor linear scarring versus atelectasis is again 
suggested in the lateral lung bases. No lung consolidation, costophrenic angle 
blunting, or pneumothorax. A loop recording device is noted in the anterior 
lower left chest wall. Degenerative changes are scattered throughout the 
visualized spine and shoulder girdles. 



IMPRESSION:  

1. NO RADIOGRAPHIC EVIDENCE OF ACUTE CARDIOPULMONARY DISEASE. MINOR LINEAR 
SCARRING OR LESS LIKELY ATELECTASIS WITHIN THE LATERAL LUNG BASES. 

2. TORTUOUS ATHEROSCLEROTIC THORACIC AORTA. 



JOB NUMBER: [id number]

MTDD